# Patient Record
Sex: FEMALE | Race: WHITE
[De-identification: names, ages, dates, MRNs, and addresses within clinical notes are randomized per-mention and may not be internally consistent; named-entity substitution may affect disease eponyms.]

---

## 2017-04-24 NOTE — NUR
1800-RIGHT GROIN CDI, IV D'C WITH CATH TIP INTACT, WRITTEN AND VERBAL
INSTRUCTIONS GIVEN TO PT. UP TO REST ROOM - VOID
1830-D'C HOME WITH FRIEND, NO DISTRESS NOTED

## 2017-04-24 NOTE — HEMODYNAMI
PATIENT:BAUTISTA ALEJANDRO                              MEDICAL RECORD: [id number]
: 46                                            LOCATION:[facility]          
ACCT# [id number]                                       ADMISSION DATE: 17
 
 
 Generatedon:201713:31
Patient name: BAUTISTA ALEJANDRO Patient #: [id number] Visit #: [id number] SSN:
  :
1950 Date of study: 2017
Page: Of
Hemodynamic Procedure Report
****************************
Patient Data
Patient Demographics
Procedure consent was obtained
First Name: BAUTISTA        Gender: Female
Last Name:          : 1950
Middle Initial: J           Age: 66 year(s)
Patient #: [id number]       Race: Unknown
Visit #: [id number]
SSN: 
Accession #:
[id number]
Additional ID: [id number]
Contact details
Address: [address] [place] [address]  Phone: [phone]
State: [place]
City: [place]
Zip code: [address]
Past Medical History
Allergies: No known allergies
Admission
Admission Data
Admission Date: 2017   Admission Time: 9:26
Arrival Date: 2017     Arrival Time: 0:00
Admit Source: Other         Insurance Payor: Medicare,
Private health insurance
Height (in.): 68            BSA: 1.76 (m2)
Height (cm.): 172.72        BMI: 21.29 (kg/m2)
Weight (lbs.): 140
Weight (kg.): 63.5
Lab Results
Lab Result Date: 2017  Lab Result Time: 0:00
Biochemistry
Name          Units    Result                Min      Max
BUN           mg/dl    21       --(----)-*   7        18
CK-MB         ng/ml    1.2      --(-*--)--   0        3.6
Creatinine    mg/dl    1        --(--*-)--   0.6      1.3
Creatinine    l       46       --(*---)--   21       215
Kinase
Troponin l    ng/ml    0.017    --(-*--)--   0        0.06
CBC
Name         Units    Result                Min      Max
Hemoglobin   g/dl     17.4     --(---*)--   13.5     17.5
Procedure
Procedure Types
Cath Procedure
Diagnostic Procedure
[facility] w/Coronaries
PCI Procedure
Coronary Stent Initial
Miscellaneous Procedures
Moderate Sedation up to 15 minutes
Procedure Description
Procedure Date
Procedure Date: 2017
Procedure Start Time: 13:05
Procedure End Time: 13:25
Procedure Staff
Name                            Function
Óscar Maria MD                Performing Physician
Liliana Hicks RT                    Scrub
Sundar Gómez RN                  Nurse
Rebekah Echevarria RT               Monitor
Procedure Data
Cath Procedure
Fluoroscopy
Diagnostic fluoroscopy      Total fluoroscopy Time: 5.2
time: 5.2 min               min
Diagnostic fluoroscopy      Total fluoroscopy dose: 758
dose: 758 mGy               mGy
Contrast Material
Contrast Material Type                       Amount (ml)
Isovue 300                                   140
Entry Location
Entry     Primary  Successful  Side  Size  Upsize Upsize Entry    Closure Succes
sful  Closure
Location                             (Fr)  1 (Fr) 2 (Fr) Remarks  Device        
      Remarks
Femoral                        Right 5 Fr  6 Fr                   Vascade
artery                                     Short                  Closure
System
Estimated blood loss: 10 ml
Diagnostic catheters
Device Type               Used For           End Catheter
Placement
Cordis 5Fr Pigtail        Procedure
Catheter (MP)
Cordis 5Fr JL 4.0         Left Coronary
Catheter (MP)             Angiography
Cordis 5Fr 3DRC Catheter  Procedure
(MP)
Procedure Complications
No complications
Procedure Medications
Medication           Administration Route Dosage
Oxygen               NC                   2 l/min
Lidocaine 2%         added to field       20
Heparin Flush Bag    added to field       2 bags
(1000units/500ml NS)
0.9% NaCl            I.V.                 100 ml/hr
Versed               I.V.                 1 mg
Fentanyl             I.V.                 50 mcg
Heparin Bolus        I.V.                 4000 units
Integrilin (Bolus    I.V.                 5.6 ml
2mg/ml)
Nitroglycerin IC/IA  I.C.                 150 mcg
Versed               I.V.                 1 mg
 
Fentanyl             I.V.                 50 mcg
Versed               I.V.                 1 mg
Fentanyl             I.V.                 50 mcg
Plavix               P.O.                 600 mg
Aspirin              P.O.                 325 mg
Hemodynamics
Rest
BSA: 1.76 (m2) HGB: 17.4 (g/dl) O2 Consumption: Estimated: 165.53 (ml/min) O2 Co
nsumption indexed:
Estimated:94.05 (ml/min/m) Heart Rate: 74 (bpm)
Snapshots
Pre Cath      Intra         NCS           Post Cath
Vital Signs
Time     Heart  Resp   SPO2 etCO2   PR6gbuq NIBP (mmHg) Rhythm  Pain    Sedation
Rate   (ipm)  (%)  (mmHg)  (mmHg)                      Status  Level
(bpm)
12:34:02 68     18     97   0       0       202/94(126) NSR     0 (11)  10(A)
, No
pain
12:38:28 64     18     97   0       0       168/88(138) NSR     0 (11)  10(A)
, No
pain
12:42:54 64     16     96   0       0       166/78(143) NSR     0 (11)  10(A)
, No
pain
12:47:17 63     19     94   0       0       149/81(127) NSR     0 (11)  10(A)
, No
pain
12:51:39 68     17     96   0       0       155/78(123) NSR     0 (11)  10(A)
, No
pain
12:56:01 63     16     93   0       0       138/77(111) NSR     0 (11)  10(A)
, No
pain
13:00:19 63     17     95   0       0       140/78(119) NSR     0 (11)  10(A)
, No
pain
13:04:37 60     16     94   0       0       137/74(116) NSR     0 (11)  10(A)
, No
pain
13:08:57 63     17     95   0       0       124/72(104) NSR     0 (11)  9(A)
, No
pain
13:13:13 65     23     96   0       0       137/70(104) NSR     0 (11)  9(A)
, No
pain
13:17:33 66     14     95   0       0       129/71(105) NSR     0 (11)  9(A)
, No
pain
13:21:49 70     16     96   0       0       137/75(118) NSR     0 (11)  9(A)
, No
pain
13:26:05 68     16     95   0       0       124/75(116) NSR     0 (11)  10(A)
, No
pain
Medications
Time     Medication       Route  Dose  Verified Delivered Reason          Notes 
   Effectiveness
by       by
12:37:26 Oxygen           NC     2     Óscar Kwok    used for
 
l/min Candace Gómez RN   procedure
12:51:15 Lidocaine 2%     added  20ml  Óscar Cantrell   for local
to     vial  Candace Maria MD  anesthetic
field
12:51:22 Heparin Flush    added  2     Óscar Cantrell   used for
Bag              to     bags  Candace Maria MD  procedure
(1000units/500ml field
NS)
12:51:31 0.9% NaCl        I.V.   100   Óscar  Buffie    Per physician
ml/hr Candace Gómez RN
13:03:49 Versed           I.V.   1 mg  Óscar Kwok    for sedation
Candace Gómez RN
13:03:56 Fentanyl         I.V.   50    Óscar  Buffie    for sedation
mcg   Candace Gómez RN
13:07:01 Fentanyl         I.V.   50    Óscar Langeie    for sedation
mcg   Candace Gómez RN
13:07:58 Versed           I.V.   1 mg  Óscar Kwok    for sedation
Candace Gómez RN
13:12:10 Heparin Bolus    I.V.   4000  Óscar Kwok    for             verifi
ed
units Candace Gómez RN   anticoagulation with dr maria
13:14:58 Integrilin       I.V.   5.6   Óscar Langeie    for             wasted
(Bolus 2mg/ml)          ml    Candace Gómez RN   antiplatelet    4.4 ml
therapy         of vial
13:16:19 Nitroglycerin    I.C.   150   Óscar Kwok    for             wasted
IC/IA                   mcg   Candace Gómez RN   vasodilation    4.4 ml
of vial
13:20:05 Versed           I.V.   1 mg  Óscar Kwok    for sedation
Candace Gómez RN
13:20:09 Fentanyl         I.V.   50    Óscar Kwok    for sedation
mcg   Candace Gómez RN
13:30:47 Plavix           P.O.   600   Óscar Kwok    for
mg    Candace Gómez RN   antiplatelet
therapy
13:30:54 Aspirin          P.O.   325   Óscar Kwok    for
mg    Candace Gómez RN   antiplatelet
therapy
Procedure Log
Time     Note
12:13:04 Patient Height : 68 cm
12:13:13 Patient Weight : 140 kg
12:13:13 Admit Source: Other
12:13:16 Arrival Date: 2017 12:00:00 AM
12:13:26 Insurance Payor : Private health insurance, Medicare
12:13:39 Diagnostic Cath Status : Elective
12:14:54 Liliana Hicks RT(R) sent for patient. Start room use.
12:14:55 Time tracking: Regular hours
12:15:03 Plan of Care:Hemodynamics will remain stable., Cardiac rhythm will shaniqua
in
stable., Comfort level will be maintained., Respiratory function will remain
adequate., Patient/ family verbilizes understanding of procedure., Procedure
tolerated without complication., Recovers from procedure without
complications..
12:15:16 H&P Date Dictated: 2017 Within 30 days and on chart., H&P Addendum
completed by physician on day of procedure. (MUST COMPLETE FOR ALL
OUTPATIENTS).
12:15:18 Pre-procedure instructions explained to patient.
12:15:39 Use device set Femoral Dx
12:15:41 Acist Syringe opened to sterile field.
 
12:15:41 Bag Decanter opened to sterile field.
12:15:42 Medline Cath Pack opened to sterile field.
12:15:42 Terumo 5Fr [place] Sheath opened to sterile field.
12:15:43 St Michael 260cm J .035 wire opened to sterile field.
12:15:44 Acist Hand Control opened to sterile field.
12:15:45 Acist Manifold opened to sterile field.
12:15:45 Diagnostic Infinity 5Fr Multipack catheter opened to sterile field.
12:15:46 Tegaderm 4 x 4 opened to sterile field.
12:19:53 Patient received from Outpatients to CCL 1 Alert and [facility]. [facility]
ed to
table in Supine position.
12:19:54 Warm blankets applied, and richard hugger turned on for patient comfort.
12:19:55 Correct patient and procedure confirmed by team.
12:19:56 Signed procedure consent form obtained from patient.
12:20:25 H&P Date Dictated: 2017 Within 30 days and on chart., H&P Addendum
completed by physician on day of procedure. (MUST COMPLETE FOR ALL
OUTPATIENTS).
12:21:04 Family in waiting room.
12:21:08 Patient NPO since Midnight.
12:21:18 Patient allergic to No known allergies
12:26:30 ECG and BP/O2 sat monitors applied to patient.
12:31:35 Vital chart was started
12:31:37 Baseline sample Acquired.
12:31:43 Rhythm: sinus rhythm
12:31:45 Full Disclosure recording started
12:31:48 Is the patient allergic to Iodine/contrast media? No.
12:32:15 Previous problem with sedation/anesthesia? Yes low bl pressure
12:32:33 IV patent on arrival in left forearm with 0.9% NaCl at [facility].
12:32:43 Snore? Yes
12:33:12 Airway obstruction? No ?
12:33:30 Airway obstruction? Yes emphazema, O2 as needed
12:33:40 Sleep apnea? No
12:33:53 Is patient on blood thinner?No
12:33:58 Patient diabetic? No.
12:: Lab Result : Creatinine 1 mg/dl
12:: Lab Result : BUN 21 mg/dl
:: Lab Result : Creatinine Kinase 46 l
12:: Lab Result : CK-MB 1.2 ng/ml
12:: Lab Result : Hemoglobin 17.4 g/dl
12: Lab Result : Troponin l 0.017 ng/ml
::58 Lab results completed and on chart.
12:36:05 Right groin area was prepped with chlora-prep and draped in sterile fas
hion
12:36:06 Alarms reviewed by R. N.
12:36:07 Sharps counted by scrub and verified by R.N.
12:36:08 Physician paged
12:37:26 Oxygen 2 l/min NC was administered by Sundar Gómez RN; used for procedur
e;
12:51:15 Lidocaine 2% 20ml vial added to field was administered by Óscar Maria MD;
for local anesthetic;
12:51:22 Heparin Flush Bag (1000units/500ml NS) 2 bags added to field was admini
stered
by Óscar Maria MD; used for procedure;
12:51:31 0.9% NaCl 100 ml/hr I.V. was administered by Sundar Gómez RN; Per physic
tano;
13:01:03 Physician arrived
13:01:04 --------ALL STOP TIME OUT------
13:01:05 Final Timeout: patient, procedure, and site verified with staff and casi burr.
 
All members of the team are in agreement.
13:01:08 Right groin site verified by team.
13:01:13 Physical assessment completed. ASA score P 2 - A patient with mild syst
emic
disease as per Óscar Maria MD.
13:01:18 Sedation plan: IV Moderate Sedation Versed, Fentanyl
13:01:29 Zero performed for pressure channel P1
13:03:49 Versed 1 mg I.V. was administered by Sundar Gómez RN; for sedation;
13:03:56 Fentanyl 50 mcg I.V. was administered by Sundar Gómez RN; for sedation;
13:05:27 Procedure started.
13:05:36 Local anesthetic to right femoral artery with Lidocaine 2% by Óscar morgan MD.**INITIAL ACCESS ONLY**
13:05:46 A 5 Fr sheath was inserted into the Right Femoral artery
13:07:01 Fentanyl 50 mcg I.V. was administered by Sundar Gómez RN; for sedation;
13:07:58 Versed 1 mg I.V. was administered by Sundar Gómez RN; for sedation;
13:07:59 A Cordis 5Fr Pigtail Catheter (MP) was advanced over the wire and used 
for
Procedure.
13:08:09 LV gram done using LLANES
13:08:16 EF : 55 %
13:08:18 Catheter removed.
13:08:30 A Cordis 5Fr JL 4.0 Catheter (MP) was advanced over the wire and used f
or Left
Coronary Angiography.
13:08:58 LCA angiography performed.
13:09:52 Catheter removed.
13:10:00 A Cordis 5Fr 3DRC Catheter (MP) was advanced over the wire and used for
Procedure.
13:10:08 RCA angiography performed.
13:11:07 Catheter removed.
13:11:32 Terumo 6Fr [place] Sheath opened to sterile field.
13:11:33 Medtronic Launcher 6Fr [place] 2.0 guide catheter opened to sterile field.
13:11:38 Merit BasixCompak Inflation Kit opened to sterile field.
13:11:45 Degroot Whisper J 300cm 0.014 guide wire opened to sterile field.
13:11:47 Proceeding to intervention.
13:12:10 Heparin Bolus 4000 units I.V. was administered by Sundar Gómez RN; for
anticoagulation; verified with dr maria
13:13:38 6 Fr AR 2 guide catheter was inserted over the wire
13:13:43 whisper wire advanced.
13:14:37 Wire advanced across lesion.
13:14:58 Integrilin (Bolus 2mg/ml) 5.6 ml I.V. was administered by Sundar Gómez R
N; for
antiplatelet therapy; wasted 4.4 ml of vial
13:15:56 Inflation Number: 1 A Biofreedom 3.0 x 18 stent was prepped and advance
d
across the Mid RCA. The stent was deployed at 11 PADMAJA for 0:10 (min:sec).
13:16:19 Nitroglycerin IC/ mcg I.C. was administered by Sundar Gómez RN; fo
r
vasodilation; wasted 4.4 ml of vial
13:18:30 Inflation Number: 1 A Biofreedom 3.0 x 11 stent was prepped and advance
d
across the Dist RCA. The stent was deployed at 9 PADMAJA for 0:05 (min:sec).
13:19:01 Inflation number: 2 The stent balloon was then re-inflated across the M
id RCA
to 13 PADMAJA for 0:10 (min:sec).
13:19:42 Inflation number: 3 The stent balloon was then re-inflated across the M
id RCA
to 7 PADMAJA for 0:10 (min:sec).
13:20:05 Versed 1 mg I.V. was administered by Sundar Gómez RN; for sedation;
 
13:20:09 Fentanyl 50 mcg I.V. was administered by Sundar Gómez RN; for sedation;
13:21:58 Vascade 6/7 Fr Closure Device opened to sterile field.
13:22:06 -----------------------------------------------------------------------
-REMOVE
D------------------------------------------------------
13:22:07 Stent catheter was removed intact over wire.
13:22:08 Balloon removed over the wire.
13:22:09 Wire removed.
13:22:09 Guide catheter removed.
13:23:52 Sheath upsized to a 6 Fr Short.
13:23:52 Sheath removed intact; hemostasis achieved with Vascade Closure System 
to the
Right Femoral artery.
13:23:55 Procedure ended.(Physican Out)
13:24:08 Fluoroscopy time 05.20 minutes.
13:24:16 Fluoroscopy dose: 758 mGy
13:24:16 Flurop Dose total: 758
13:24:21 Contrast amount:Isovue 300 140ml.
13:24:23 Sharps counted by scrub and verified by R.N.
13:24:25 Insertion/operative site no bleeding no hematoma.
13:24:30 Post right femoral artery:stable
13:24:40 Post-procedure physical assessment completed. ASA score P 2 - A patient
 with
mild systemic disease as per Óscar Maria MD.
13:24:46 Post procedure rhythm: sinus rhythm
13:24:49 Estimated blood loss: 10 ml
13:24:51 Post procedure instruction explained to patient.Patient verbalizes
understanding.
13:24:58 Procedure type changed to Cath procedure, Diagnostic procedure, LHC, LH
C
w/Coronaries, PCI procedure, Coronary Stent Initial, Miscellaneous Procedures,
Moderate Sedation up to 15 minutes
13:25:04 Procedure and supply charges have been captured, reviewed, submitted an
d are
correct.
13:25:25 Procedure Complication : No complications
13:25:30 Vital chart was stopped
13:25:30 See physician's report for complete and final results.
13:25:42 Report given to Pre/Post Procedure Room.
13:25:47 Patient transfered to Pre/Post Procedure Room with Stretcher.
13:25:49 Procedure ended.
13:25:49 Full Disclosure recording stopped
13:25:52 End room use (Document Last)
13:26:14 **ACC-PCI Only** Patient was given prescriptions, or instructed by Hitesh Maria MD to start/continue the following medications upon discharge: Plavix
13:30:47 Plavix 600 mg P.O. was administered by Sundar Gómez RN; for antiplatelet
therapy;
13:30:54 Aspirin 325 mg P.O. was administered by Sundar Gómez RN; for antiplatele
t
therapy;
Intervention Summary
Intervention Notes
Time     ActionType  Lesion and  Equipment  Action#  Pressure  Duration
Attributes  Used
13:15:56 Place stent Mid RCA     Biofreedom 1        11        00:10
3.0 x 18
stent
13:18:30 Place stent Dist RCA    Biofreedom 1        9         00:05
3.0 x 11
 
stent
13:19:01 Reinflate   Mid RCA     Biofreedom 2        13        00:10
stent                   3.0 x 11
balloon                 stent
13:19:42 Reinflate   Mid RCA     Biofreedom 3        7         00:10
stent                   3.0 x 11
balloon                 stent
Device Usage
Item Name   Manufacture  Quantity  Catalog       Hospital Part    Current Minima
l Lot# /
Number        Charge   Number  Stock   Stock   Serial#
Code
[facility]        1         [id number]         369880   [id number]  [id number]  20
Relevvant
Bag         Microtek     1                  190748   93921   884231  5
[employer] Inc[employer]
Medline     Cardinal     1         [id number]     587284   66477   627538  5
Cath Pack   Health
Terumo 5Fr  Terumo       1         [id number]        105824   478369  317470  40
[place]
Sheath
St Michael     St Michael      1         650099        420646   979269  126401  30
260cm J
.035 wire
Acist Hand  Acist        1         03354         069125   351116  671484  5
Control     Medical
Systems Inc
Acist       Acist        1         02591         409401   956407  453883  5
Ablative Solutions    Medical
Systems Inc
Diagnostic  Cardinal     1         [id number]        169532   31992   034221  30
Infinity    Health
5Fr
Multipack
catheter
Tegaderm 4  3M           1         1626W         722144   677547  470124  5
x 4
Cordis 5Fr  Cardinal     1                                        264647  5
Pigtail     Health
Catheter
(MP)
Cordis 5Fr  Cardinal     1                                        164925  5
JL 4.0      Health
Catheter
(MP)
Cordis 5Fr  Cardinal     1                                        138307  5
3DRC        Health
Catheter
(MP)
Terumo 6Fr  Terumo       1         [id number]        152045   116166  688637  40
[place]
Sheath
Medtronic   Medtronic    1         [id number]       561784   80382   262744  1
Launcher
6Fr [place] 2.0
guide
catheter
Merit       Merit        1         [id number]        715583   078963  052587  15
BasixCompak Medical
 
Inflation
Kit
Degroot      Degroot       1         [id number]     783342   991219  308005  5
Whisper J   Vascular
300cm 0.014
guide wire
Biofreedom  Biosensors   1         [id number]    354399           538226  5     
  [id number]
3.0 x 18    [place]
stent
Biofreedom  Biosensors   1         [id number]    760467           681468  5     
  [id number]
3.0 x 11    [place]
stent
Vascade 6/7 Cardiva      1         [id number]  269600   504077  318167  5
Fr Closure  Medical,
Device      Inc.
Signature Audit Paxton
Stage           Time        Signature      Unsigned
Intra-Procedure 2017   Rebekah Echevarria
1:31:55 PM  RT(R)
Signatures
Monitor : Rebekah Echevarria Signature :
RT                       ______________________________
Date : ______________ Time :
______________
 
 
 
 
 
 
 
 
 
 
 
 
 
 
 
 
 
 
 
 
 
 
 
 
 
 
 
 
 
[facility]                                          
[address] [place] [address]

## 2017-04-24 NOTE — NUR
1400-RIGHT GROIN CDI, C/O PAIN IN JAW, NORCO X 1 GIVEN PO
1430-RIGHT GROIN CDI, NO HEMATOMA OR BLEEDING NOTED, JAW PAIN MUCH
BETTER

## 2017-04-26 NOTE — OP
PATIENT NAME:  BAUTISTA ALEJANDRO                       MEDICAL RECORD: [id number]
:46                                             LOCATION:[facility]          
                                                         ADMISSION DATE:        
SURGEON:  ROMAIN HOSKINS MD             
 
 
DATE OF OPERATION:  2017
 
PROCEDURES:
1.  PTCA stent, RCA.
2.  Left heart catheterization.
3.  Selective coronary angiography.
4.  Left ventriculogram.
 
INDICATIONS:  Angina and coronary artery disease.
 
PROCEDURE IN DETAIL:  After informed consent was obtained and after detailed
explanation of risks, benefits as well as alternative therapies, the patient
elected to proceed with angiogram and angioplasty.  The right femoral area was
prepped and draped in normal sterile fashion.  The right femoral artery was
cannulated via modified Seldinger technique with placement of 6-Faroese sheath. 
All catheters exchanged through this sheath.
 
FINDINGS:  The left ventriculogram was performed in the standard 30-degree LLANES
view, reveals good cardiac wall motion throughout all segments.  Overall
ejection fraction is 60%.
 
SELECTIVE CORONARY ANGIOGRAPHY:
1.  Left main showed no significant angiographic disease.
2.  Left anterior descending has moderate irregularities, but no flow-limiting
stenosis.
3.  The left circumflex shows moderate irregularities, but no flow-limiting
stenosis.
4.  The right coronary has previously placed stents.  The mid stent has 70%-75%
in-stent restenosis.  This lesion was approximately 18 mm long, RIKI 3 flow
before the intervention.
 
PTCA STENT OF THE RIGHT CORONARY:  This was addressed with a 3.0 x 18 and a 3.0
x 11, both BioFreedom stents.  Result was 0% residual stenosis, RIKI 3 flow. 
Reference vessel diameter is 3.0.
 
OVERALL IMPRESSION:  Successful percutaneous transluminal coronary angioplasty
stent of the right coronary artery in-stent restenosis going from 70%-75%
initial stenosis to 0% residual.
 
TRANSINT:[id number] Voice Confirmation ID: [id number] DOCUMENT ID: [id number]
                                           
                                           ROMAIN HOSKINS MD             
 
 
 
Electronically Signed by ROMAIN HOSKINS on 17 at 0806
CC:                                                             [id number]
DICTATION DATE: 17 1330     :     17 1821      DEP CLI 
                                                                      [address]
[place] [address]

## 2019-01-16 ENCOUNTER — HOSPITAL ENCOUNTER (OUTPATIENT)
Dept: HOSPITAL 84 - D.CT | Age: 69
Discharge: HOME | End: 2019-01-16
Attending: FAMILY MEDICINE
Payer: MEDICARE

## 2019-01-16 VITALS — BODY MASS INDEX: 22.3 KG/M2

## 2019-01-16 DIAGNOSIS — R91.8: Primary | ICD-10-CM

## 2019-04-04 ENCOUNTER — HOSPITAL ENCOUNTER (OUTPATIENT)
Dept: HOSPITAL 84 - D.RT | Age: 69
Discharge: HOME | End: 2019-04-04
Attending: NURSE PRACTITIONER
Payer: MEDICARE

## 2019-04-04 VITALS — BODY MASS INDEX: 22.3 KG/M2

## 2019-04-04 DIAGNOSIS — J44.9: Primary | ICD-10-CM

## 2019-06-14 ENCOUNTER — HOSPITAL ENCOUNTER (OUTPATIENT)
Dept: HOSPITAL 84 - D.HCCARDIO | Age: 69
Discharge: HOME | End: 2019-06-14
Attending: INTERNAL MEDICINE
Payer: MEDICARE

## 2019-06-14 VITALS — BODY MASS INDEX: 22.3 KG/M2

## 2019-06-14 DIAGNOSIS — I25.10: Primary | ICD-10-CM

## 2019-07-05 ENCOUNTER — HOSPITAL ENCOUNTER (OUTPATIENT)
Dept: HOSPITAL 84 - D.RT | Age: 69
Discharge: HOME | End: 2019-07-05
Attending: INTERNAL MEDICINE
Payer: MEDICARE

## 2019-07-05 VITALS — BODY MASS INDEX: 22.3 KG/M2

## 2019-07-05 DIAGNOSIS — J44.9: Primary | ICD-10-CM

## 2019-10-31 ENCOUNTER — HOSPITAL ENCOUNTER (OUTPATIENT)
Dept: HOSPITAL 84 - D.RAD | Age: 69
Discharge: HOME | End: 2019-10-31
Attending: THORACIC SURGERY (CARDIOTHORACIC VASCULAR SURGERY)
Payer: MEDICARE

## 2019-10-31 VITALS — BODY MASS INDEX: 22.3 KG/M2

## 2019-10-31 DIAGNOSIS — C34.90: Primary | ICD-10-CM

## 2019-12-10 ENCOUNTER — HOSPITAL ENCOUNTER (OUTPATIENT)
Dept: HOSPITAL 84 - D.OPS | Age: 69
Discharge: HOME | End: 2019-12-10
Attending: THORACIC SURGERY (CARDIOTHORACIC VASCULAR SURGERY)
Payer: MEDICARE

## 2019-12-10 VITALS — BODY MASS INDEX: 22.3 KG/M2

## 2019-12-10 DIAGNOSIS — R13.12: Primary | ICD-10-CM

## 2019-12-19 ENCOUNTER — HOSPITAL ENCOUNTER (OUTPATIENT)
Dept: HOSPITAL 84 - D.MRI | Age: 69
Discharge: HOME | End: 2019-12-19
Attending: NURSE PRACTITIONER
Payer: MEDICARE

## 2019-12-19 VITALS — BODY MASS INDEX: 22.3 KG/M2

## 2019-12-19 DIAGNOSIS — M54.16: Primary | ICD-10-CM

## 2019-12-19 DIAGNOSIS — M54.6: ICD-10-CM

## 2021-06-07 ENCOUNTER — HOSPITAL ENCOUNTER (OUTPATIENT)
Dept: HOSPITAL 84 - D.LAB | Age: 71
Discharge: HOME | End: 2021-06-07
Attending: INTERNAL MEDICINE
Payer: MEDICARE

## 2021-06-07 VITALS — BODY MASS INDEX: 23.3 KG/M2

## 2021-06-07 DIAGNOSIS — Z11.52: Primary | ICD-10-CM

## 2021-06-11 ENCOUNTER — HOSPITAL ENCOUNTER (OUTPATIENT)
Dept: HOSPITAL 84 - D.RT | Age: 71
Discharge: HOME | End: 2021-06-11
Attending: INTERNAL MEDICINE
Payer: MEDICARE

## 2021-06-11 VITALS — BODY MASS INDEX: 23.3 KG/M2

## 2021-06-11 DIAGNOSIS — J44.9: Primary | ICD-10-CM
